# Patient Record
Sex: MALE | Race: WHITE
[De-identification: names, ages, dates, MRNs, and addresses within clinical notes are randomized per-mention and may not be internally consistent; named-entity substitution may affect disease eponyms.]

---

## 2020-02-18 ENCOUNTER — HOSPITAL ENCOUNTER (EMERGENCY)
Dept: HOSPITAL 53 - M ED | Age: 25
Discharge: HOME | End: 2020-02-18
Payer: COMMERCIAL

## 2020-02-18 VITALS — WEIGHT: 169.32 LBS | BODY MASS INDEX: 28.91 KG/M2 | HEIGHT: 64 IN

## 2020-02-18 VITALS — DIASTOLIC BLOOD PRESSURE: 75 MMHG | SYSTOLIC BLOOD PRESSURE: 120 MMHG
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
## 2020-02-18 DIAGNOSIS — S06.0X0A: Primary | ICD-10-CM

## 2020-02-18 PROCEDURE — 70450 CT HEAD/BRAIN W/O DYE: CPT

## 2020-02-18 PROCEDURE — 99283 EMERGENCY DEPT VISIT LOW MDM: CPT

## 2020-02-18 NOTE — REPVR
PROCEDURE INFORMATION: 

Exam: CT Head Without Contrast 

Exam date and time: 2/18/2020 5:18 PM 

Age: 24 years old 

Clinical indication: Pain; Headache not specified; Additional info: Left 

headache retro orbital ? subacute trauma (+etoh) 



TECHNIQUE: 

Imaging protocol: Computed tomography of the head without contrast. Axial and 

coronal reformatted images were created and reviewed.

Radiation optimization: All CT scans at this facility use at least one of these 

dose optimization techniques: automated exposure control; mA and/or kV 

adjustment per patient size (includes targeted exams where dose is matched to 

clinical indication); or iterative reconstruction. 



COMPARISON: 

No relevant prior studies available. 



FINDINGS: 

Brain: No CT evidence of acute intracranial hemorrhage or acute territorial 

infarction. No significant mass effect or midline shift. Basal cisterns patent. 

Ventricles: Normal in size and configuration. 

Bones/joints: No acute osseous abnormality. 

Sinuses: Grossly unremarkable. 

Mastoid air cells: Grossly unremarkable. 

Soft tissues: Grossly unremarkable. 



IMPRESSION: 

No CT evidence of acute intracranial pathology. 



Electronically signed by: Pepe Saunders On 02/18/2020  17:41:49 PM

## 2021-04-29 ENCOUNTER — HOSPITAL ENCOUNTER (OUTPATIENT)
Dept: HOSPITAL 53 - M SLEEP | Age: 26
End: 2021-04-29
Attending: NURSE PRACTITIONER
Payer: COMMERCIAL

## 2021-04-29 DIAGNOSIS — R06.83: Primary | ICD-10-CM

## 2021-12-15 ENCOUNTER — HOSPITAL ENCOUNTER (OUTPATIENT)
Dept: HOSPITAL 53 - M PLAIMG | Age: 26
End: 2021-12-15
Attending: INTERNAL MEDICINE

## 2021-12-15 DIAGNOSIS — R06.02: Primary | ICD-10-CM

## 2021-12-15 NOTE — REP
INDICATION:

SOB/PAIN.



COMPARISON:

None.



TECHNIQUE:

AP and lateral views



FINDINGS:

There is no acute fracture or destructive osseous lesion



IMPRESSION:

Within normal limits





<Electronically signed by Oscar Crane > 12/15/21 1100

## 2021-12-15 NOTE — REP
INDICATION:

SOB/PAIN



COMPARISON:

None.



TECHNIQUE:

AP, lateral, coned-down views of the lumbar spine.



FINDINGS:

Three views of the lumbosacral spine demonstrate satisfactory alignment and lordosis

without acute fracture / compression injury or subluxation.



IMPRESSION:

1. No acute fracture / compression injury or subluxation.

2. No significant degenerative changes.





<Electronically signed by Bal Clements > 12/15/21 1111

## 2021-12-15 NOTE — REP
INDICATION:

SOB/PAIN



COMPARISON:

None



TECHNIQUE:

Three views



FINDINGS:

There is no evidence of a destructive osseous lesion.  The compartments appear

symmetric and well maintained.



IMPRESSION:

No acute osseous abnormality





<Electronically signed by Oscar Crane > 12/15/21 1345

## 2021-12-15 NOTE — REP
INDICATION:

SOB/PAIN



COMPARISON:

None.



TECHNIQUE:

AP, lateral, and swimmers views.



FINDINGS:

Alignment and kyphosis is maintained.  Vertebral bodies intact.  No acute fracture /

compression injury or subluxation.  Very subtle spurring and minimal endplate

sclerosis at T7-8 and T8-9 is nonspecific.  Paravertebral soft tissues are normal.



IMPRESSION:

Essentially age-appropriate examination as noted above.  Questionable chronic changes

at T7-8 and T8-9.





<Electronically signed by Bal Clements > 12/15/21 3809

## 2021-12-15 NOTE — REP
INDICATION:

SOB/PAIN.



COMPARISON:

None.



TECHNIQUE:

PA and lateral



FINDINGS:

The superior mediastinal structures are midline.  The cardiac silhouette is

unremarkable in size, shape, and position.  The diaphragmatic surfaces of the lungs

are regular, and the costophrenic angles are clear.  The pulmonary fields are clear.

The imaged osseous structures are intact.





IMPRESSION:

There is no acute cardiopulmonary disease.





<Electronically signed by Oscar Crane > 12/15/21 1100

## 2021-12-15 NOTE — REP
INDICATION:

SOB/PAIN.



COMPARISON:

None.



TECHNIQUE:

Water's, PA, lateral and SMV views of the sinuses.



FINDINGS:

Sinuses are well aerated and essentially clear.  No obvious mucosal thickening.  No

fluid level.  No foreign body or abnormal calcifications.  Surrounding soft tissues

and osseous structures are intact.



IMPRESSION:

Normal sinus radiographs.





<Electronically signed by Bal Clements > 12/15/21 1105

## 2024-01-09 ENCOUNTER — HOSPITAL ENCOUNTER (OUTPATIENT)
Dept: HOSPITAL 53 - M SMT | Age: 29
End: 2024-01-09
Attending: UROLOGY
Payer: OTHER GOVERNMENT

## 2024-01-09 DIAGNOSIS — M79.81: Primary | ICD-10-CM

## 2024-01-09 PROCEDURE — 88305 TISSUE EXAM BY PATHOLOGIST: CPT

## 2024-01-09 PROCEDURE — 11423 EXC H-F-NK-SP B9+MARG 2.1-3: CPT

## 2024-04-17 ENCOUNTER — HOSPITAL ENCOUNTER (OUTPATIENT)
Dept: HOSPITAL 53 - M OUTALCOH | Age: 29
End: 2024-04-17
Attending: PSYCHIATRY & NEUROLOGY
Payer: OTHER GOVERNMENT

## 2024-04-17 DIAGNOSIS — F12.20: ICD-10-CM

## 2024-04-17 DIAGNOSIS — F10.20: Primary | ICD-10-CM

## 2024-04-26 ENCOUNTER — HOSPITAL ENCOUNTER (OUTPATIENT)
Dept: HOSPITAL 53 - M OUTALCOH | Age: 29
LOS: 4 days | End: 2024-04-30
Attending: PSYCHIATRY & NEUROLOGY
Payer: OTHER GOVERNMENT

## 2024-04-26 DIAGNOSIS — F12.20: ICD-10-CM

## 2024-04-26 DIAGNOSIS — F17.200: ICD-10-CM

## 2024-04-26 DIAGNOSIS — F10.10: Primary | ICD-10-CM
